# Patient Record
Sex: MALE | Race: WHITE | Employment: FULL TIME | URBAN - METROPOLITAN AREA
[De-identification: names, ages, dates, MRNs, and addresses within clinical notes are randomized per-mention and may not be internally consistent; named-entity substitution may affect disease eponyms.]

---

## 2021-09-16 ENCOUNTER — HOSPITAL ENCOUNTER (OUTPATIENT)
Dept: CT IMAGING | Age: 66
Discharge: HOME | End: 2021-09-16
Payer: COMMERCIAL

## 2021-09-16 DIAGNOSIS — I10 PRIMARY HYPERTENSION: ICD-10-CM

## 2021-09-16 DIAGNOSIS — N20.0 CALCULUS OF KIDNEY: ICD-10-CM

## 2021-09-16 PROCEDURE — 74176 CT ABD & PELVIS W/O CONTRAST: CPT

## 2021-11-29 ENCOUNTER — HOSPITAL ENCOUNTER (OUTPATIENT)
Dept: ULTRASOUND IMAGING | Age: 66
Discharge: HOME | End: 2021-11-29
Payer: COMMERCIAL

## 2021-11-29 DIAGNOSIS — I10 ESSENTIAL HYPERTENSION: ICD-10-CM

## 2021-11-29 DIAGNOSIS — N20.0 CALCULUS OF KIDNEY: ICD-10-CM

## 2021-11-29 PROCEDURE — 76770 US EXAM ABDO BACK WALL COMP: CPT

## 2022-05-05 ENCOUNTER — HOSPITAL ENCOUNTER (OUTPATIENT)
Age: 67
Discharge: HOME | End: 2022-05-05
Payer: COMMERCIAL

## 2022-05-05 ENCOUNTER — HOSPITAL ENCOUNTER (OUTPATIENT)
Dept: GENERAL RADIOLOGY | Age: 67
Discharge: HOME | End: 2022-05-05
Payer: COMMERCIAL

## 2022-05-05 DIAGNOSIS — N20.0 CALCULUS OF KIDNEY: ICD-10-CM

## 2022-05-05 DIAGNOSIS — I10 HYPERTENSION, ESSENTIAL: ICD-10-CM

## 2022-05-05 PROCEDURE — 74018 RADEX ABDOMEN 1 VIEW: CPT

## 2023-09-03 ENCOUNTER — APPOINTMENT (OUTPATIENT)
Dept: CT IMAGING | Facility: HOSPITAL | Age: 68
End: 2023-09-03
Payer: COMMERCIAL

## 2023-09-03 ENCOUNTER — APPOINTMENT (OUTPATIENT)
Dept: GENERAL RADIOLOGY | Facility: HOSPITAL | Age: 68
End: 2023-09-03
Payer: COMMERCIAL

## 2023-09-03 ENCOUNTER — HOSPITAL ENCOUNTER (EMERGENCY)
Facility: HOSPITAL | Age: 68
Discharge: HOME | End: 2023-09-03
Attending: EMERGENCY MEDICINE
Payer: COMMERCIAL

## 2023-09-03 VITALS
TEMPERATURE: 97.5 F | BODY MASS INDEX: 25.06 KG/M2 | DIASTOLIC BLOOD PRESSURE: 113 MMHG | OXYGEN SATURATION: 99 % | HEIGHT: 72 IN | SYSTOLIC BLOOD PRESSURE: 196 MMHG | RESPIRATION RATE: 24 BRPM | WEIGHT: 185 LBS | HEART RATE: 90 BPM

## 2023-09-03 DIAGNOSIS — S09.90XA CLOSED HEAD INJURY WITHOUT LOSS OF CONSCIOUSNESS, INITIAL ENCOUNTER: Primary | ICD-10-CM

## 2023-09-03 PROCEDURE — 70450 CT HEAD/BRAIN W/O DYE: CPT

## 2023-09-03 PROCEDURE — 6360000002 HC RX W HCPCS: Performed by: EMERGENCY MEDICINE

## 2023-09-03 PROCEDURE — 73130 X-RAY EXAM OF HAND: CPT

## 2023-09-03 PROCEDURE — 90715 TDAP VACCINE 7 YRS/> IM: CPT | Performed by: EMERGENCY MEDICINE

## 2023-09-03 PROCEDURE — 90471 IMMUNIZATION ADMIN: CPT | Performed by: EMERGENCY MEDICINE

## 2023-09-03 PROCEDURE — 99284 EMERGENCY DEPT VISIT MOD MDM: CPT

## 2023-09-03 RX ADMIN — TETANUS TOXOID, REDUCED DIPHTHERIA TOXOID AND ACELLULAR PERTUSSIS VACCINE, ADSORBED 0.5 ML: 5; 2.5; 8; 8; 2.5 SUSPENSION INTRAMUSCULAR at 15:12

## 2023-09-03 NOTE — ED NOTES
Discharge instructions reviewed with verbalized understanding from patient. Patient had no further questions or concerns.         Araceli Hickey RN  09/03/23 [id number]

## 2023-09-03 NOTE — ED PROVIDER NOTES
[facility] [address] [facility] ENCOUNTER        Pt Name: Bee Haines  MRN: [id number]  [address] [facility] [place] 1955  Date of evaluation: 9/3/2023  Provider: Annamaria Amador MD  PCP: Andrea Farmer MD  Note Started: 2:50 PM EDT 9/3/23    CHIEF COMPLAINT       Chief Complaint   Patient presents with    Head Injury       HISTORY OF PRESENT ILLNESS: 1 or more Elements     History from : Patient    Limitations to history : None    Bee Hainse is a 74 y.o. male who presents complaining that he was hiking going over some rocks when he slipped and fell forward landing on his right head and his upper forehead this happened about 4 hours ago he sustained 2 small puncture wounds to the upper forehead which had been just oozing a little bit ever since he did not actually get knocked out but did feel \"woozy\" afterwards he is not on any type of anticoagulants. Nursing Notes were all reviewed and agreed with or any disagreements were addressed in the HPI. REVIEW OF SYSTEMS :      Review of Systems     systems reviewed and negative except as in HPI/MDM    SURGICAL HISTORY     Past Surgical History:   Procedure Laterality Date    COLONOSCOPY      TONSILLECTOMY      UPPP UVULOPALATOPHARYGOPLASTY      Sleep Apnea       CURRENTMEDICATIONS       Previous Medications    BISOPROLOL (ZEBETA) 5 MG TABLET    Take 5 mg by mouth daily. MELOXICAM (MOBIC) 7.5 MG TABLET    Take 7.5 mg by mouth daily. OXYCODONE 5 MG CAPSULE    Take 5 mg by mouth every 4 hours as needed for Pain. TAKE 1/2 TO 1 TAB EVERY SIX TO EIGHT HOURS    TIZANIDINE (ZANAFLEX) 2 MG TABLET    Take 2 mg by mouth every 6 hours as needed. ALLERGIES     Amoxicillin, Decongestant-antihistamine [triprolidine-pseudoephedrine], and Tylenol [acetaminophen]    FAMILYHISTORY     History reviewed. No pertinent family history.      SOCIAL HISTORY       Social History     Tobacco Use    Smoking status: Never   Substance Use Topics

## 2025-04-24 ENCOUNTER — TRANSCRIBE ORDERS (OUTPATIENT)
Dept: ADMINISTRATIVE | Age: 70
End: 2025-04-24

## 2025-04-24 DIAGNOSIS — N20.0 CALCULUS OF KIDNEY: Primary | ICD-10-CM

## 2025-05-01 ENCOUNTER — HOSPITAL ENCOUNTER (OUTPATIENT)
Dept: CT IMAGING | Age: 70
Discharge: HOME | End: 2025-05-01
Attending: UROLOGY

## 2025-05-01 DIAGNOSIS — N20.0 CALCULUS OF KIDNEY: ICD-10-CM

## 2025-05-12 ENCOUNTER — HOSPITAL ENCOUNTER (OUTPATIENT)
Dept: CT IMAGING | Age: 70
Discharge: HOME | End: 2025-05-12
Attending: UROLOGY
Payer: MEDICARE

## 2025-05-12 DIAGNOSIS — N20.0 CALCULUS OF KIDNEY: ICD-10-CM

## 2025-05-12 PROCEDURE — 74176 CT ABD & PELVIS W/O CONTRAST: CPT
